# Patient Record
Sex: FEMALE | Race: WHITE | Employment: OTHER | URBAN - METROPOLITAN AREA
[De-identification: names, ages, dates, MRNs, and addresses within clinical notes are randomized per-mention and may not be internally consistent; named-entity substitution may affect disease eponyms.]

---

## 2018-05-15 ENCOUNTER — HOSPITAL ENCOUNTER (OUTPATIENT)
Dept: MAMMOGRAPHY | Age: 73
Discharge: HOME | End: 2018-05-17
Payer: MEDICARE

## 2018-05-15 DIAGNOSIS — Z12.39 BREAST CANCER SCREENING: ICD-10-CM

## 2018-05-15 PROCEDURE — 77063 BREAST TOMOSYNTHESIS BI: CPT

## 2019-02-27 ENCOUNTER — HOSPITAL ENCOUNTER (OUTPATIENT)
Age: 74
Discharge: HOME | End: 2019-02-27
Payer: MEDICARE

## 2019-02-27 LAB — HBA1C MFR BLD: 9.5 % (ref 4–5.6)

## 2019-02-27 PROCEDURE — 83036 HEMOGLOBIN GLYCOSYLATED A1C: CPT

## 2019-02-27 PROCEDURE — 36415 COLL VENOUS BLD VENIPUNCTURE: CPT

## 2021-11-30 ENCOUNTER — HOSPITAL ENCOUNTER (OUTPATIENT)
Dept: MAMMOGRAPHY | Age: 76
Discharge: HOME | End: 2021-12-02
Payer: MEDICARE

## 2021-11-30 VITALS — HEIGHT: 62 IN | BODY MASS INDEX: 35.88 KG/M2 | WEIGHT: 195 LBS

## 2021-11-30 DIAGNOSIS — Z12.31 ENCOUNTER FOR SCREENING MAMMOGRAM FOR MALIGNANT NEOPLASM OF BREAST: ICD-10-CM

## 2021-11-30 PROCEDURE — 77067 SCR MAMMO BI INCL CAD: CPT

## 2023-03-13 ENCOUNTER — HOSPITAL ENCOUNTER (OUTPATIENT)
Dept: MAMMOGRAPHY | Age: 78
Discharge: HOME | End: 2023-03-15
Payer: MEDICARE

## 2023-03-13 VITALS — WEIGHT: 190 LBS | BODY MASS INDEX: 34.96 KG/M2 | HEIGHT: 62 IN

## 2023-03-13 DIAGNOSIS — Z12.31 ENCOUNTER FOR SCREENING MAMMOGRAM FOR MALIGNANT NEOPLASM OF BREAST: ICD-10-CM

## 2023-03-13 PROCEDURE — 77067 SCR MAMMO BI INCL CAD: CPT

## 2024-03-25 ENCOUNTER — HOSPITAL ENCOUNTER (EMERGENCY)
Age: 79
Discharge: HOME | End: 2024-03-25
Attending: FAMILY MEDICINE
Payer: MEDICARE

## 2024-03-25 VITALS
WEIGHT: 192 LBS | TEMPERATURE: 98.3 F | HEIGHT: 62 IN | BODY MASS INDEX: 35.33 KG/M2 | DIASTOLIC BLOOD PRESSURE: 84 MMHG | SYSTOLIC BLOOD PRESSURE: 138 MMHG | HEART RATE: 83 BPM | OXYGEN SATURATION: 99 % | RESPIRATION RATE: 18 BRPM

## 2024-03-25 DIAGNOSIS — J02.0 STREP PHARYNGITIS: Primary | ICD-10-CM

## 2024-03-25 LAB
SPECIMEN SOURCE: ABNORMAL
STREP A, MOLECULAR: POSITIVE

## 2024-03-25 PROCEDURE — 87651 STREP A DNA AMP PROBE: CPT

## 2024-03-25 PROCEDURE — 99283 EMERGENCY DEPT VISIT LOW MDM: CPT

## 2024-03-25 RX ORDER — ROSUVASTATIN CALCIUM 10 MG/1
10 TABLET, COATED ORAL DAILY
COMMUNITY
Start: 2024-03-07

## 2024-03-25 RX ORDER — GUAIFENESIN AND DEXTROMETHORPHAN HYDROBROMIDE 1200; 60 MG/1; MG/1
1 TABLET, EXTENDED RELEASE ORAL 2 TIMES DAILY
Qty: 28 TABLET | Refills: 0 | Status: SHIPPED | OUTPATIENT
Start: 2024-03-25

## 2024-03-25 RX ORDER — LISINOPRIL 20 MG/1
20 TABLET ORAL 2 TIMES DAILY
COMMUNITY
Start: 2024-03-07

## 2024-03-25 RX ORDER — BIMATOPROST 0.1 MG/ML
1 SOLUTION/ DROPS OPHTHALMIC NIGHTLY
COMMUNITY
Start: 2024-01-31

## 2024-03-25 RX ORDER — DEXTROMETHORPHAN HYDROBROMIDE AND PROMETHAZINE HYDROCHLORIDE 15; 6.25 MG/5ML; MG/5ML
5 SYRUP ORAL 4 TIMES DAILY PRN
Qty: 118 ML | Refills: 0 | Status: SHIPPED | OUTPATIENT
Start: 2024-03-25 | End: 2024-04-01

## 2024-03-25 RX ORDER — AZITHROMYCIN 250 MG/1
TABLET, FILM COATED ORAL
Qty: 1 PACKET | Refills: 0 | Status: SHIPPED | OUTPATIENT
Start: 2024-03-25 | End: 2024-03-29

## 2024-03-25 RX ORDER — AMLODIPINE BESYLATE 5 MG/1
5 TABLET ORAL DAILY
COMMUNITY
Start: 2024-03-07

## 2024-03-25 RX ORDER — DORZOLAMIDE HYDROCHLORIDE AND TIMOLOL MALEATE 20; 5 MG/ML; MG/ML
1 SOLUTION/ DROPS OPHTHALMIC 2 TIMES DAILY
COMMUNITY

## 2024-03-25 NOTE — ED PROVIDER NOTES
HPI:  3/25/24,   Time: 1:20 PM EDT         Laurence Ding is a 78 y.o. female presenting to the ED for 4 days of sore throat, and nasal congestion clear nasal discharge throat and cough that is difficulty producing sputum, thick the drainage going down the back of the throat as well.  She denies nausea vomiting or diarrhea.  She denies fever chills or bodyaches and she denies sick contacts.  She denies history of reactive airway disease.  She denies chest tightness or difficulty breathing.        ROS:   Pertinent positives and negatives are stated within HPI, all other systems reviewed and are negative.  --------------------------------------------- PAST HISTORY ---------------------------------------------  Past Medical History:  has a past medical history of Anxiety, Diabetes mellitus (HCC), and Glaucoma.    Past Surgical History:  has a past surgical history that includes Hysterectomy;  section; and Breast surgery.    Social History:  reports that she has never smoked. She has never used smokeless tobacco. She reports that she does not drink alcohol.    Family History: family history includes Brain Cancer in her brother; Breast Cancer in her sister; Colon Cancer in her father; Lung Cancer in her mother.     The patient’s home medications have been reviewed.    Allergies: Codeine, Epinephrine, Iodine, and Pcn [penicillins]    -------------------------------------------------- RESULTS -------------------------------------------------  All laboratory and radiology results have been personally reviewed by myself   LABS:  Results for orders placed or performed during the hospital encounter of 24   Strep Screen Rapid    Specimen: Throat   Result Value Ref Range    Source .THROAT SWAB     Strep A, Molecular POSITIVE (A) NEGATIVE       RADIOLOGY:  Interpreted by Radiologist.  No orders to display       ------------------------- NURSING NOTES AND VITALS REVIEWED ---------------------------   The nursing